# Patient Record
Sex: FEMALE | Race: WHITE | NOT HISPANIC OR LATINO | Employment: UNEMPLOYED | URBAN - NONMETROPOLITAN AREA
[De-identification: names, ages, dates, MRNs, and addresses within clinical notes are randomized per-mention and may not be internally consistent; named-entity substitution may affect disease eponyms.]

---

## 2020-01-30 ENCOUNTER — OFFICE VISIT (OUTPATIENT)
Dept: CARDIOLOGY | Facility: CLINIC | Age: 45
End: 2020-01-30

## 2020-01-30 VITALS
HEIGHT: 61 IN | HEART RATE: 78 BPM | DIASTOLIC BLOOD PRESSURE: 112 MMHG | SYSTOLIC BLOOD PRESSURE: 180 MMHG | WEIGHT: 293 LBS | BODY MASS INDEX: 55.32 KG/M2

## 2020-01-30 DIAGNOSIS — E03.9 HYPOTHYROIDISM, UNSPECIFIED TYPE: ICD-10-CM

## 2020-01-30 DIAGNOSIS — G47.30 SLEEP APNEA IN ADULT: ICD-10-CM

## 2020-01-30 DIAGNOSIS — E66.01 MORBIDLY OBESE (HCC): ICD-10-CM

## 2020-01-30 DIAGNOSIS — E78.00 HYPERCHOLESTEREMIA: ICD-10-CM

## 2020-01-30 DIAGNOSIS — R06.02 SHORTNESS OF BREATH: ICD-10-CM

## 2020-01-30 DIAGNOSIS — I10 ESSENTIAL HYPERTENSION: Primary | ICD-10-CM

## 2020-01-30 DIAGNOSIS — E55.9 VITAMIN D DEFICIENCY: ICD-10-CM

## 2020-01-30 DIAGNOSIS — R01.1 MURMUR, CARDIAC: ICD-10-CM

## 2020-01-30 DIAGNOSIS — R53.82 CHRONIC FATIGUE: ICD-10-CM

## 2020-01-30 PROCEDURE — 93000 ELECTROCARDIOGRAM COMPLETE: CPT | Performed by: INTERNAL MEDICINE

## 2020-01-30 PROCEDURE — 99204 OFFICE O/P NEW MOD 45 MIN: CPT | Performed by: INTERNAL MEDICINE

## 2020-01-30 RX ORDER — NIFEDIPINE 60 MG/1
60 TABLET, EXTENDED RELEASE ORAL DAILY
Qty: 30 TABLET | Refills: 6 | Status: SHIPPED | OUTPATIENT
Start: 2020-01-30 | End: 2020-08-14 | Stop reason: SDUPTHER

## 2020-01-30 RX ORDER — LISINOPRIL AND HYDROCHLOROTHIAZIDE 25; 20 MG/1; MG/1
1 TABLET ORAL DAILY
COMMUNITY
End: 2021-04-01 | Stop reason: SINTOL

## 2020-01-30 RX ORDER — ERGOCALCIFEROL 1.25 MG/1
50000 CAPSULE ORAL WEEKLY
Qty: 5 CAPSULE | Refills: 4 | Status: SHIPPED | OUTPATIENT
Start: 2020-01-30 | End: 2020-04-25 | Stop reason: SDUPTHER

## 2020-01-30 RX ORDER — CYCLOBENZAPRINE HCL 10 MG
10 TABLET ORAL NIGHTLY
COMMUNITY

## 2020-01-30 RX ORDER — LEVOTHYROXINE SODIUM 0.05 MG/1
75 TABLET ORAL DAILY
COMMUNITY

## 2020-01-30 RX ORDER — BUPROPION HYDROCHLORIDE 150 MG/1
150 TABLET, EXTENDED RELEASE ORAL DAILY
COMMUNITY
End: 2021-04-01 | Stop reason: ALTCHOICE

## 2020-01-30 RX ORDER — IBUPROFEN 800 MG/1
800 TABLET ORAL EVERY 8 HOURS PRN
COMMUNITY
End: 2021-04-01 | Stop reason: ALTCHOICE

## 2020-01-30 NOTE — PATIENT INSTRUCTIONS
Mediterranean Diet  A Mediterranean diet refers to food and lifestyle choices that are based on the traditions of countries located on the [place]. This way of eating has been shown to help prevent certain conditions and improve outcomes for people who have chronic diseases, like kidney disease and heart disease.  What are tips for following this plan?  Lifestyle  · Cook and eat meals together with your family, when possible.  · Drink enough fluid to keep your urine clear or pale yellow.  · Be physically active every day. This includes:  ? Aerobic exercise like running or swimming.  ? Leisure activities like gardening, walking, or housework.  · Get 7-8 hours of sleep each night.  · If recommended by your health care provider, drink red wine in moderation. This means 1 glass a day for nonpregnant women and 2 glasses a day for men. A glass of wine equals 5 oz (150 mL).  Reading food labels    · Check the serving size of packaged foods. For foods such as rice and pasta, the serving size refers to the amount of cooked product, not dry.  · Check the total fat in packaged foods. Avoid foods that have saturated fat or trans fats.  · Check the ingredients list for added sugars, such as corn syrup.  Shopping  · At the grocery store, buy most of your food from the areas near the walls of the store. This includes:  ? Fresh fruits and vegetables (produce).  ? Grains, beans, nuts, and seeds. Some of these may be available in unpackaged forms or large amounts (in bulk).  ? Fresh seafood.  ? Poultry and eggs.  ? Low-fat dairy products.  · Buy whole ingredients instead of prepackaged foods.  · Buy fresh fruits and vegetables in-season from local farmers markets.  · Buy frozen fruits and vegetables in resealable bags.  · If you do not have access to quality fresh seafood, buy precooked frozen shrimp or canned fish, such as tuna, salmon, or sardines.  · Buy small amounts of raw or cooked vegetables, salads, or olives from  the deli or salad bar at your store.  · Stock your pantry so you always have certain foods on hand, such as olive oil, canned tuna, canned tomatoes, rice, pasta, and beans.  Cooking  · Cook foods with extra-virgin olive oil instead of using butter or other vegetable oils.  · Have meat as a side dish, and have vegetables or grains as your main dish. This means having meat in small portions or adding small amounts of meat to foods like pasta or stew.  · Use beans or vegetables instead of meat in common dishes like chili or lasagna.  · [place] with different cooking methods. Try roasting or broiling vegetables instead of steaming or sautéeing them.  · Add frozen vegetables to soups, stews, pasta, or rice.  · Add nuts or seeds for added healthy fat at each meal. You can add these to yogurt, salads, or vegetable dishes.  · Marinate fish or vegetables using olive oil, lemon juice, garlic, and fresh herbs.  Meal planning    · Plan to eat 1 vegetarian meal one day each week. Try to work up to 2 vegetarian meals, if possible.  · Eat seafood 2 or more times a week.  · Have healthy snacks readily available, such as:  ? Vegetable sticks with hummus.  ? Greek yogurt.  ? Fruit and nut trail mix.  · Eat balanced meals throughout the week. This includes:  ? Fruit: 2-3 servings a day  ? Vegetables: 4-5 servings a day  ? Low-fat dairy: 2 servings a day  ? Fish, poultry, or lean meat: 1 serving a day  ? Beans and legumes: 2 or more servings a week  ? Nuts and seeds: 1-2 servings a day  ? Whole grains: 6-8 servings a day  ? Extra-virgin olive oil: 3-4 servings a day  · Limit red meat and sweets to only a few servings a month  What are my food choices?  · Mediterranean diet  ? Recommended  ? Grains: Whole-grain pasta. Brown rice. Bulgar wheat. Polenta. Couscous. Whole-wheat bread. Oatmeal. Quinoa.  ? Vegetables: Artichokes. Beets. Broccoli. Cabbage. Carrots. Eggplant. Green beans. Chard. Kale. Spinach. Onions. Leeks. Peas. Squash.  Tomatoes. Peppers. Radishes.  ? Fruits: Apples. Apricots. Avocado. Berries. Bananas. Cherries. Dates. Figs. Grapes. [place]. Melon. Oranges. Peaches. Plums. Pomegranate.  ? Meats and other protein foods: Beans. Almonds. Sunflower seeds. Pine nuts. Peanuts. Cod. [place]. Scallops. Shrimp. Tuna. Tilapia. Clams. Oysters. Eggs.  ? Dairy: Low-fat milk. Cheese. Greek yogurt.  ? Beverages: Water. Red wine. Herbal tea.  ? Fats and oils: Extra virgin olive oil. Avocado oil. Grape seed oil.  ? Sweets and desserts: Greek yogurt with honey. Baked apples. Poached pears. Trail mix.  ? Seasoning and other foods: Basil. Cilantro. Coriander. Cumin. Mint. Parsley. Luis. Rosemary. Tarragon. Garlic. Oregano. Thyme. Pepper. Balsalmic vinegar. Tahini. Hummus. Tomato sauce. Olives. Mushrooms.  ? Limit these  ? Grains: Prepackaged pasta or rice dishes. Prepackaged cereal with added sugar.  ? Vegetables: Deep fried potatoes (french fries).  ? Fruits: Fruit canned in syrup.  ? Meats and other protein foods: Beef. Pork. Lamb. Poultry with skin. Hot dogs. Mike.  ? Dairy: Ice cream. Sour cream. Whole milk.  ? Beverages: Juice. Sugar-sweetened soft drinks. Beer. Liquor and spirits.  ? Fats and oils: Butter. Canola oil. Vegetable oil. Beef fat (tallow). Lard.  ? Sweets and desserts: Cookies. Cakes. Pies. Candy.  ? Seasoning and other foods: Mayonnaise. Premade sauces and marinades.  ? The items listed may not be a complete list. Talk with your dietitian about what dietary choices are right for you.  Summary  · The Mediterranean diet includes both food and lifestyle choices.  · Eat a variety of fresh fruits and vegetables, beans, nuts, seeds, and whole grains.  · Limit the amount of red meat and sweets that you eat.  · Talk with your health care provider about whether it is safe for you to drink red wine in moderation. This means 1 glass a day for nonpregnant women and 2 glasses a day for men. A glass of wine equals 5 oz (150 mL).  This information  is not intended to replace advice given to you by your health care provider. Make sure you discuss any questions you have with your health care provider.  Document Released: 08/10/2017 Document Revised: 09/12/2017 Document Reviewed: 08/10/2017  [employer] Interactive Patient Education © 2019 [employer]

## 2020-01-30 NOTE — PROGRESS NOTES
Chief Complaint   Patient presents with   • Establish Care     HTN, strong family history of CAD   • Hypertension     recently diagnosed, PCP started metoprolol and Lisinopril 2 days ago. Did not take BP meds this morning   • Cardiac history     none   • Aspirin     does not take a daily aspirin   • Labs     most recent labs in chart, lipids elevated, PCP suggested diet and getting thyroid within normal range   • Shortness of Breath     with walking up an incline or stairs, resolves after resting.    • Knee Pain     having a lot of knee pain after a fall, not sure if she could walk on the treadmill.  To see ortho next week.         CARDIAC COMPLAINTS  dyspnea and fatigue      Subjective   Rola Hogan is a 44 y.o. female came in today for her initial cardiac evaluation.  She has no previously diagnosed cardiac problem.  She was seen at your office recently for routine checkup.  She was noted to have a heart rate of 96 with elevated blood pressure of more than 180.  She was put on an ACE inhibitors and beta-blockers.  She has been having increasing shortness of breath for the last few months and apparently the symptoms got worse recently.  It gets worse on incline or walking 1 flight of steps and only resting helps her resolve the dyspnea.  She also has been having a lot of problem with her knee and she is due to see the orthopedic surgeon.  She did undergo some lab work at your office found to have a normal blood count.  Her blood sugar was elevated at 125.  Total cholesterol was 213 with a triglyceride of 161 LDL of 122.  Her A1c was 122 and vitamin D was 11.6.  She also noted to have a normal TSH but low T3 uptake.  She apparently was put on thyroid supplements which she has not started yet.  She also complained of feeling more fatigue and tired in the last 6 months.  She is not able to sleep properly and she wakes up multiple times.  She denies having any chest pain or palpitation.  She does have some  occasional dizziness.  She denies having any nausea or vomiting.  She has no history of smoking.  She does have a family history of premature coronary artery disease.  It runs mostly in the father side of the family.    History reviewed. No pertinent surgical history.    Current Outpatient Medications   Medication Sig Dispense Refill   • buPROPion SR (WELLBUTRIN SR) 150 MG 12 hr tablet Take 150 mg by mouth Daily.     • cyclobenzaprine (FLEXERIL) 10 MG tablet Take 10 mg by mouth Every Night.     • ibuprofen (ADVIL,MOTRIN) 800 MG tablet Take 800 mg by mouth Every 8 (Eight) Hours As Needed for Mild Pain .     • levothyroxine (SYNTHROID, LEVOTHROID) 50 MCG tablet Take 50 mcg by mouth Daily.     • lisinopril-hydrochlorothiazide (PRINZIDE,ZESTORETIC) 20-25 MG per tablet Take 1 tablet by mouth Daily.     • metoprolol tartrate (LOPRESSOR) 25 MG tablet Take 25 mg by mouth Daily.     • NIFEdipine XL (PROCARDIA XL) 60 MG 24 hr tablet Take 1 tablet by mouth Daily. 30 tablet 6     No current facility-administered medications for this visit.            ALLERGIES:  Clove oil; Codeine; Penicillins; and Sulfa antibiotics    Past Medical History:   Diagnosis Date   • Anxiety    • Depression    • History of cholecystectomy    • History of tonsillectomy    • History of tubal ligation    • Hyperlipidemia    • Hypertension    • Hypothyroidism    • Knee pain    • PTSD (post-traumatic stress disorder)        Social History     Tobacco Use   Smoking Status Never Smoker   Smokeless Tobacco Never Used          Family History   Problem Relation Age of Onset   • Cancer Mother    • Heart attack Father         MI in his 60's   • Heart disease Father         s/p multiple stents   • Hyperlipidemia Father    • Hypertension Father    • No Known Problems Sister    • Heart attack Paternal Aunt    • Heart attack Paternal Uncle    • Cancer Maternal Grandmother    • Other Maternal Grandfather         history unknown   • Other Paternal Grandmother          " from complications of gallbladder problems   • Heart attack Paternal Grandfather         MI in his 40's   • Heart disease Paternal Grandfather         CABG   • No Known Problems Sister    • Heart attack Paternal Uncle    • Bipolar disorder Daughter    • Fibromyalgia Daughter        Review of Systems   Constitution: Positive for malaise/fatigue and weight gain. Negative for decreased appetite.   HENT: Negative for congestion and sore throat.    Eyes: Negative for blurred vision.   Cardiovascular: Positive for dyspnea on exertion. Negative for chest pain and palpitations.   Respiratory: Positive for shortness of breath and snoring.    Endocrine: Negative for cold intolerance and heat intolerance.   Hematologic/Lymphatic: Negative for adenopathy. Does not bruise/bleed easily.   Skin: Negative for itching, nail changes and skin cancer.   Musculoskeletal: Positive for arthritis and joint pain. Negative for myalgias.   Gastrointestinal: Negative for abdominal pain, dysphagia and heartburn.   Genitourinary: Negative for bladder incontinence and frequency.   Neurological: Positive for excessive daytime sleepiness. Negative for dizziness, light-headedness, seizures and vertigo.   Psychiatric/Behavioral: Negative for altered mental status.   Allergic/Immunologic: Negative for environmental allergies and hives.       Diabetes- No  Thyroid- abnormal    Objective     BP (!) 180/112 (BP Location: Left arm)   Pulse 78   Ht 154.9 cm (61\")   Wt (!) 151 kg (333 lb)   BMI 62.92 kg/m²     Physical Exam   Constitutional: She is oriented to person, place, and time. She appears well-developed and well-nourished.   HENT:   Head: Normocephalic.   Nose: Nose normal.   Eyes: Pupils are equal, round, and reactive to light. EOM are normal.   Neck: Normal range of motion. Neck supple.   Cardiovascular: Normal rate, regular rhythm, S1 normal and S2 normal.   Murmur heard.  Pulmonary/Chest: Effort normal and breath sounds normal.   "   Abdominal: Soft. Bowel sounds are normal.   Musculoskeletal: Normal range of motion. She exhibits edema.   Neurological: She is alert and oriented to person, place, and time.   Skin: Skin is warm and dry.   Psychiatric: She has a normal mood and affect.         ECG 12 Lead  Date/Time: 1/30/2020 12:47 PM  Performed by: Bony Galvan MD  Authorized by: Bony Galvan MD   Previous ECG: no previous ECG available  Rhythm: sinus rhythm  Rate: normal  QRS axis: normal  Other findings: T wave abnormality    Clinical impression: abnormal EKG              Assessment/Plan   Patient's Body mass index is 62.92 kg/m². BMI is above normal parameters. Recommendations include: educational material, exercise counseling, nutrition counseling and referral to a weight management program.     Rola was seen today for establish care, hypertension, cardiac history, aspirin, labs, shortness of breath and knee pain.    Diagnoses and all orders for this visit:    Essential hypertension  -     NIFEdipine XL (PROCARDIA XL) 60 MG 24 hr tablet; Take 1 tablet by mouth Daily.    Hypercholesteremia    Hypothyroidism, unspecified type    Morbidly obese (CMS/HCC)    Shortness of breath  -     Adult Transthoracic Echo Complete W/ Cont if Necessary Per Protocol; Future    Murmur, cardiac  -     Adult Transthoracic Echo Complete W/ Cont if Necessary Per Protocol; Future    Chronic fatigue  -     Overnight Sleep Oximetry Study; Future    Sleep apnea in adult  -     Overnight Sleep Oximetry Study; Future       At baseline, her heart rate is stable.  Her blood pressure is significantly elevated.  Her EKG shows sinus rhythm, nonspecific T wave changes.  Her clinical examination reveals a BMI of close to 63.  She does have normal heart sounds and soft systolic murmur at the mitral area.  She has trace pedal edema and normal peripheral pulse.    Regarding her hypertension, it is still significantly elevated.  I started her on Procardia XL 60  mg once a day.  She is advised to recheck her blood pressure in 1 week.  If it is getting much better than we can try to reduce the dose of the ACE inhibitor.    Regarding her mild elevation of cholesterol, at this time we will put her on a strict diet and recheck it in 6 months.  If it is still elevated then she might need to be on a statin.    Regarding the hypothyroidism, she has a low T3 uptake and Synthroid has been started.  Needs to be monitored closely.    She does have significant obesity.  I had a long talk with her about diet exercise and weight reduction.  I gave her papers on Mediterranean diet.  I also made an appointment for her to see the bariatric surgeon.    Regarding the chronic fatigue, I scheduled her to undergo nocturnal pulse PO2 measurement.  If it is significantly abnormal then she may need to undergo sleep study.    Regarding her shortness of breath as well as with the mild heart murmur, I scheduled her to undergo an echocardiogram to evaluate LV function, valvular structures, PA pressure.    Based on the results, further recommendations will be made.             Electronically signed by Bony Galvan MD January 30, 2020 12:36 PM

## 2020-02-12 ENCOUNTER — HOSPITAL ENCOUNTER (OUTPATIENT)
Dept: CARDIOLOGY | Facility: HOSPITAL | Age: 45
Discharge: HOME | End: 2020-02-12
Admitting: INTERNAL MEDICINE

## 2020-02-12 VITALS — BODY MASS INDEX: 55.32 KG/M2 | HEIGHT: 61 IN | WEIGHT: 293 LBS

## 2020-02-12 DIAGNOSIS — R01.1 MURMUR, CARDIAC: ICD-10-CM

## 2020-02-12 DIAGNOSIS — R06.02 SHORTNESS OF BREATH: ICD-10-CM

## 2020-02-12 LAB
AORTIC DIMENSIONLESS INDEX: 0.7 (DI)
BH CV ECHO MEAS - AO MAX PG (FULL): 4.9 MMHG
BH CV ECHO MEAS - AO MAX PG: 9.6 MMHG
BH CV ECHO MEAS - AO MEAN PG (FULL): 3 MMHG
BH CV ECHO MEAS - AO MEAN PG: 5 MMHG
BH CV ECHO MEAS - AO ROOT AREA (BSA CORRECTED): 1.3
BH CV ECHO MEAS - AO ROOT AREA: 7.5 CM^2
BH CV ECHO MEAS - AO ROOT DIAM: 3.1 CM
BH CV ECHO MEAS - AO V2 MAX: 155 CM/SEC
BH CV ECHO MEAS - AO V2 MEAN: 101 CM/SEC
BH CV ECHO MEAS - AO V2 VTI: 34.4 CM
BH CV ECHO MEAS - BSA(HAYCOCK): 2.7 M^2
BH CV ECHO MEAS - BSA: 2.3 M^2
BH CV ECHO MEAS - BZI_BMI: 62.9 KILOGRAMS/M^2
BH CV ECHO MEAS - BZI_METRIC_HEIGHT: 154.9 CM
BH CV ECHO MEAS - BZI_METRIC_WEIGHT: 151 KG
BH CV ECHO MEAS - EDV(CUBED): 106.5 ML
BH CV ECHO MEAS - EDV(TEICH): 104.4 ML
BH CV ECHO MEAS - EF(CUBED): 77.1 %
BH CV ECHO MEAS - EF(TEICH): 69.1 %
BH CV ECHO MEAS - ESV(CUBED): 24.4 ML
BH CV ECHO MEAS - ESV(TEICH): 32.2 ML
BH CV ECHO MEAS - FS: 38.8 %
BH CV ECHO MEAS - IVS/LVPW: 0.92
BH CV ECHO MEAS - IVSD: 1.1 CM
BH CV ECHO MEAS - LA DIMENSION: 3.4 CM
BH CV ECHO MEAS - LA/AO: 1.1
BH CV ECHO MEAS - LAT PEAK E' VEL: 10.8 CM/SEC
BH CV ECHO MEAS - LV IVRT: 0.1 SEC
BH CV ECHO MEAS - LV MASS(C)D: 191.3 GRAMS
BH CV ECHO MEAS - LV MASS(C)DI: 81.5 GRAMS/M^2
BH CV ECHO MEAS - LV MAX PG: 4.8 MMHG
BH CV ECHO MEAS - LV MEAN PG: 2 MMHG
BH CV ECHO MEAS - LV V1 MAX: 109 CM/SEC
BH CV ECHO MEAS - LV V1 MEAN: 69.5 CM/SEC
BH CV ECHO MEAS - LV V1 VTI: 26.9 CM
BH CV ECHO MEAS - LVIDD: 4.7 CM
BH CV ECHO MEAS - LVIDS: 2.9 CM
BH CV ECHO MEAS - LVPWD: 1.2 CM
BH CV ECHO MEAS - MED PEAK E' VEL: 10.6 CM/SEC
BH CV ECHO MEAS - MV A MAX VEL: 87.8 CM/SEC
BH CV ECHO MEAS - MV DEC SLOPE: 691 CM/SEC^2
BH CV ECHO MEAS - MV DEC TIME: 0.19 SEC
BH CV ECHO MEAS - MV E MAX VEL: 110 CM/SEC
BH CV ECHO MEAS - MV E/A: 1.3
BH CV ECHO MEAS - MV MAX PG: 5.6 MMHG
BH CV ECHO MEAS - MV MEAN PG: 3 MMHG
BH CV ECHO MEAS - MV P1/2T MAX VEL: 131 CM/SEC
BH CV ECHO MEAS - MV P1/2T: 55.5 MSEC
BH CV ECHO MEAS - MV V2 MAX: 118 CM/SEC
BH CV ECHO MEAS - MV V2 MEAN: 73.7 CM/SEC
BH CV ECHO MEAS - MV V2 VTI: 33.7 CM
BH CV ECHO MEAS - MVA P1/2T LCG: 1.7 CM^2
BH CV ECHO MEAS - MVA(P1/2T): 4 CM^2
BH CV ECHO MEAS - PA MAX PG (FULL): 1.4 MMHG
BH CV ECHO MEAS - PA MAX PG: 3.8 MMHG
BH CV ECHO MEAS - PA MEAN PG (FULL): 1 MMHG
BH CV ECHO MEAS - PA MEAN PG: 2 MMHG
BH CV ECHO MEAS - PA V2 MAX: 97.6 CM/SEC
BH CV ECHO MEAS - PA V2 MEAN: 58.2 CM/SEC
BH CV ECHO MEAS - PA V2 VTI: 22 CM
BH CV ECHO MEAS - RAP SYSTOLE: 10 MMHG
BH CV ECHO MEAS - RV MAX PG: 2.4 MMHG
BH CV ECHO MEAS - RV MEAN PG: 1 MMHG
BH CV ECHO MEAS - RV V1 MAX: 77.7 CM/SEC
BH CV ECHO MEAS - RV V1 MEAN: 48.8 CM/SEC
BH CV ECHO MEAS - RV V1 VTI: 17.8 CM
BH CV ECHO MEAS - RVDD: 2.6 CM
BH CV ECHO MEAS - RVSP: 22 MMHG
BH CV ECHO MEAS - SI(AO): 110.7 ML/M^2
BH CV ECHO MEAS - SI(CUBED): 35 ML/M^2
BH CV ECHO MEAS - SI(TEICH): 30.8 ML/M^2
BH CV ECHO MEAS - SV(AO): 259.6 ML
BH CV ECHO MEAS - SV(CUBED): 82.1 ML
BH CV ECHO MEAS - SV(TEICH): 72.2 ML
BH CV ECHO MEAS - TR MAX VEL: 173 CM/SEC
BH CV ECHO MEASUREMENTS AVERAGE E/E' RATIO: 10.28
MAXIMAL PREDICTED HEART RATE: 177 BPM
STRESS TARGET HR: 150 BPM

## 2020-02-12 PROCEDURE — 93306 TTE W/DOPPLER COMPLETE: CPT

## 2020-02-12 PROCEDURE — 93306 TTE W/DOPPLER COMPLETE: CPT | Performed by: INTERNAL MEDICINE

## 2020-04-25 DIAGNOSIS — E55.9 VITAMIN D DEFICIENCY: ICD-10-CM

## 2020-04-27 RX ORDER — ERGOCALCIFEROL 1.25 MG/1
50000 CAPSULE ORAL WEEKLY
Qty: 5 CAPSULE | Refills: 4 | Status: SHIPPED | OUTPATIENT
Start: 2020-04-27 | End: 2020-08-14 | Stop reason: SDUPTHER

## 2020-04-27 RX ORDER — ERGOCALCIFEROL 1.25 MG/1
50000 CAPSULE ORAL WEEKLY
Qty: 5 CAPSULE | Refills: 4 | Status: SHIPPED | OUTPATIENT
Start: 2020-04-27 | End: 2020-04-27 | Stop reason: SDUPTHER

## 2020-08-14 ENCOUNTER — OFFICE VISIT (OUTPATIENT)
Dept: CARDIOLOGY | Facility: CLINIC | Age: 45
End: 2020-08-14

## 2020-08-14 VITALS
HEART RATE: 72 BPM | DIASTOLIC BLOOD PRESSURE: 68 MMHG | WEIGHT: 293 LBS | TEMPERATURE: 97.1 F | SYSTOLIC BLOOD PRESSURE: 118 MMHG | BODY MASS INDEX: 55.32 KG/M2 | HEIGHT: 61 IN

## 2020-08-14 DIAGNOSIS — E55.9 VITAMIN D DEFICIENCY: ICD-10-CM

## 2020-08-14 DIAGNOSIS — I10 ESSENTIAL HYPERTENSION: ICD-10-CM

## 2020-08-14 DIAGNOSIS — E66.01 CLASS 3 SEVERE OBESITY DUE TO EXCESS CALORIES WITH SERIOUS COMORBIDITY AND BODY MASS INDEX (BMI) OF 60.0 TO 69.9 IN ADULT (HCC): ICD-10-CM

## 2020-08-14 DIAGNOSIS — G47.34 NOCTURNAL OXYGEN DESATURATION: ICD-10-CM

## 2020-08-14 DIAGNOSIS — R06.02 SHORTNESS OF BREATH: ICD-10-CM

## 2020-08-14 PROCEDURE — 99213 OFFICE O/P EST LOW 20 MIN: CPT | Performed by: NURSE PRACTITIONER

## 2020-08-14 RX ORDER — BUSPIRONE HYDROCHLORIDE 10 MG/1
10 TABLET ORAL 2 TIMES DAILY
COMMUNITY

## 2020-08-14 RX ORDER — ERGOCALCIFEROL 1.25 MG/1
50000 CAPSULE ORAL WEEKLY
Qty: 5 CAPSULE | Refills: 6 | Status: SHIPPED | OUTPATIENT
Start: 2020-08-14

## 2020-08-14 RX ORDER — NIFEDIPINE 60 MG/1
60 TABLET, EXTENDED RELEASE ORAL DAILY
Qty: 30 TABLET | Refills: 6 | Status: SHIPPED | OUTPATIENT
Start: 2020-08-14 | End: 2021-04-01 | Stop reason: SDUPTHER

## 2020-08-14 NOTE — PROGRESS NOTES
Chief Complaint   Patient presents with   • Follow-up     Cardiac management . Had Overnight Oximetry and  is now using Oxygen 2L/NC . Has not been able to have weight loss surgery   • Hypertension     Reports HTN has improved , last reading at PCP  was 106/71   • Med Refill     Needs refills on Nifidipine and Vitamin D 30 day supply to [facility] pharmacy       Gagandeep Hogan is a 45 y.o. female seen in January 2020 for initial cardiac evaluation of increased heart rate and blood pressure.  ACE inhibitor and beta-blocker were started.  No previous cardiac problems noted.  Her symptoms included shortness of breath and fatigue.  She admitted to sleep issues in form of waking up multiple times.  Labs had shown increased blood sugar being 125.  Total cholesterol 213, triglycerides 161, and , A1c was increased and vitamin D was low being 11.6.  TSH was normal but T3 uptake low.  At consultation Procardia XL was prescribed.  Echo and overnight oxygen study ordered.  LVEF normal and mild MR noted.  Overnight study showed significant nocturnal desaturation.    Today she comes to the office for a follow up visit. She is now using oxygen at night with benefit but watch monitor shows she still has episodes of desaturation. No cardiac symptoms reported. She does have shortness of breath with exertion but no worse than before. Blood pressure is well controlled with current medications. Despite diet effort she has not been able to lose weight and plans to have weight loss surgery once scheduling elective surgery post covid restrictions.        Cardiac History:    Past Surgical History:   Procedure Laterality Date   • ECHO - CONVERTED  02/12/2020    TLS. EF 65%. Mild MR. RVSP- 22 mmHg.       Current Outpatient Medications   Medication Sig Dispense Refill   • buPROPion SR (WELLBUTRIN SR) 150 MG 12 hr tablet Take 150 mg by mouth Daily.     • busPIRone (BUSPAR) 10 MG tablet Take 10 mg by mouth 2 (two) times a day.      • cyclobenzaprine (FLEXERIL) 10 MG tablet Take 10 mg by mouth Every Night.     • ibuprofen (ADVIL,MOTRIN) 800 MG tablet Take 800 mg by mouth Every 8 (Eight) Hours As Needed for Mild Pain .     • levothyroxine (SYNTHROID, LEVOTHROID) 50 MCG tablet Take 75 mcg by mouth Daily.     • lisinopril-hydrochlorothiazide (PRINZIDE,ZESTORETIC) 20-25 MG per tablet Take 1 tablet by mouth Daily.     • metoprolol tartrate (LOPRESSOR) 25 MG tablet Take 25 mg by mouth Daily.     • NIFEdipine XL (PROCARDIA XL) 60 MG 24 hr tablet Take 1 tablet by mouth Daily. 30 tablet 6   • vitamin D (ERGOCALCIFEROL) 1.25 MG (80115 UT) capsule capsule Take 1 capsule by mouth 1 (One) Time Per Week. 5 capsule 6     No current facility-administered medications for this visit.        Clove oil; Codeine; Penicillins; and Sulfa antibiotics    Past Medical History:   Diagnosis Date   • Anxiety    • Depression    • History of cholecystectomy    • History of tonsillectomy    • History of tubal ligation    • Hyperlipidemia    • Hypertension    • Hypothyroidism    • Knee pain    • PTSD (post-traumatic stress disorder)        Social History     Socioeconomic History   • Marital status:      Spouse name: Not on file   • Number of children: Not on file   • Years of education: Not on file   • Highest education level: Not on file   Tobacco Use   • Smoking status: Never Smoker   • Smokeless tobacco: Never Used   Substance and Sexual Activity   • Alcohol use: Never     Frequency: Never   • Drug use: Never       Family History   Problem Relation Age of Onset   • Cancer Mother    • Heart attack Father         MI in his 60's   • Heart disease Father         s/p multiple stents   • Hyperlipidemia Father    • Hypertension Father    • No Known Problems Sister    • Heart attack Paternal Aunt    • Heart attack Paternal Uncle    • Cancer Maternal Grandmother    • Other Maternal Grandfather         history unknown   • Other Paternal Grandmother          from  "complications of gallbladder problems   • Heart attack Paternal Grandfather         MI in his 40's   • Heart disease Paternal Grandfather         CABG   • No Known Problems Sister    • Heart attack Paternal Uncle    • Bipolar disorder Daughter    • Fibromyalgia Daughter        Review of Systems   Constitution: Negative for decreased appetite, diaphoresis and malaise/fatigue.   HENT: Negative for nosebleeds.    Eyes: Negative for blurred vision.   Cardiovascular: Negative for chest pain, claudication, cyanosis, dyspnea on exertion, irregular heartbeat, leg swelling, near-syncope, orthopnea, palpitations, paroxysmal nocturnal dyspnea and syncope.   Respiratory: Positive for shortness of breath and sleep disturbances due to breathing.    Endocrine: Negative for cold intolerance and heat intolerance.   Hematologic/Lymphatic: Does not bruise/bleed easily.   Skin: Negative for rash.   Musculoskeletal: Negative for myalgias.   Gastrointestinal: Negative for heartburn, melena and nausea.   Genitourinary: Negative for dysuria and hematuria.   Neurological: Negative for dizziness and light-headedness.   Psychiatric/Behavioral: The patient does not have insomnia and is not nervous/anxious.         Objective     /68 (BP Location: Right arm)   Pulse 72   Temp 97.1 °F (36.2 °C)   Ht 154 cm (60.63\")   Wt (!) 153 kg (338 lb)   BMI 64.65 kg/m²     Physical Exam   Constitutional: She is oriented to person, place, and time. She appears well-nourished.   HENT:   Head: Normocephalic.   Eyes: Pupils are equal, round, and reactive to light. Conjunctivae are normal.   Neck: Normal range of motion. Neck supple. Carotid bruit is not present.   Cardiovascular: Normal rate, regular rhythm, S1 normal and S2 normal.   No murmur heard.  Pulmonary/Chest: Breath sounds normal. She has no wheezes. She has no rales.   Abdominal: Soft. Bowel sounds are normal.   Musculoskeletal: Normal range of motion. She exhibits no edema.   Neurological: " She is alert and oriented to person, place, and time.   Skin: Skin is warm.   Psychiatric: She has a normal mood and affect. Her behavior is normal.        Procedures: none today       Problem List Items Addressed This Visit        Cardiovascular and Mediastinum    Essential hypertension    Relevant Medications    NIFEdipine XL (PROCARDIA XL) 60 MG 24 hr tablet       Respiratory    Shortness of breath    Relevant Orders    Ambulatory Referral to Pulmonology (Completed)      Other Visit Diagnoses     Nocturnal oxygen desaturation        Relevant Orders    Ambulatory Referral to Pulmonology (Completed)    Class 3 severe obesity due to excess calories with serious comorbidity and body mass index (BMI) of 60.0 to 69.9 in adult (CMS/[facility])        Vitamin D deficiency        Relevant Medications    vitamin D (ERGOCALCIFEROL) 1.25 MG (21612 UT) capsule capsule         Her blood pressure is well controlled. Heart rate and rhythm normal. Continue same cardiac medications and refills faxed to her pharmacy.     She has completed trial of weight loss management by diet which was unsuccessful. She plans to proceed with weight loss surgery. I advised her to contact the office if cardiac clearance needed.     Patient's Body mass index is 64.65 kg/m². BMI is above normal parameters. Recommendations include: nutrition counseling. Continue diet efforts. Exercise is limited due to history of knee problems and weight.     She will see you for lab orders/management. Please forward copy of next lab results. Refills given for Vit D. Please recheck level next lab order.     From a cardiac standpoint patient appears stable. No further cardiac workup advised at this time. A 6 month follow up visit scheduled. Please call sooner for cardiac concerns.            Electronically signed by NOMAN Reyez,  August 14, 2020 11:23

## 2020-08-14 NOTE — PATIENT INSTRUCTIONS
Metabolic Syndrome  Metabolic syndrome occurs when you have a combination of three or more factors that increase your chances of developing cardiovascular disease and diabetes. These factors include:  · High fasting blood sugar (glucose).  · High blood triglyceride level.  · High blood pressure.  · Low levels of high-density lipoprotein (HDL) blood cholesterol.  · Having a waist measurement that is:  ? More than 40 inches in men.  ? More than 35 inches in women.  Metabolic syndrome is sometimes called insulin resistance syndrome or syndrome X.  What are the causes?  The exact cause of this condition is not known. It may be related to a combination of the factors that were passed down from your parents (genes) and things that you do, eat, and drink (lifestyle choices).  What increases the risk?  You are more likely to develop this condition if you:  · Eat a diet high in calories and saturated fat.  · Do not exercise regularly.  · Are obese.  · Have a family history of type 2 diabetes mellitus.  · Have insulin resistance.  · Have a history of gestational diabetes during a previous pregnancy.  · Have conditions such as cardiovascular disease, nonalcoholic fatty liver disease, or polycystic ovary syndrome (PCOS).  · Are older. The risk increases with age.  · Use any tobacco products, including cigarettes, chewing tobacco, or e-cigarettes.  What are the signs or symptoms?  Metabolic syndrome has no specific symptoms. Having abnormal blood test results may be the only signs of metabolic syndrome.  How is this diagnosed?  This condition may be diagnosed based on:  · Your blood pressure measurements.  · Your waist measurement.  · Blood tests.  · Your personal and family medical history.  How is this treated?  Treatment may include:  · Lifestyle changes to reduce your risk for heart disease, stroke, and diabetes, such as:  ? Exercise.  ? Weight loss.  ? Eating a healthy diet.  ? Stopping tobacco and nicotine  use.  · Medicines that:  ? Help your body maintain normal blood glucose levels.  ? Lower your blood pressure and your blood triglyceride levels.  Follow these instructions at home:         · Take over-the-counter and prescription medicines only as told by your health care provider.  · Exercise regularly, as told by your health care provider.  · Eat a healthy diet that includes fresh fruits and vegetables, whole grains, lean proteins, and low-fat or nonfat dairy products.  · Maintain a healthy weight. Work with your health care provider to lose weight safely, if needed.  · Do not use any products that contain nicotine or tobacco, such as cigarettes and e-cigarettes. If you need help quitting, ask your health care provider.  · If directed, measure your waist regularly and write down the measurements. To measure your waist:  ? Stand up straight.  ? Breathe out.  ? Wrap a measuring tape around the part of your waist that is just above your hip bones.  ? Read and write down the measurement.  · Keep all follow-up visits as told by your health care provider. This is important.  Contact a health care provider if:  · You feel very tired.  · You are extremely thirsty.  · You urinate a lot more than usual.  · Your waist gets bigger.  · You have headaches that do not go away.  Get help right away if:  · You suddenly develop any of the following:  ? Dizziness.  ? Blurry vision.  ? Trouble speaking.  ? Trouble swallowing.  ? Weakness in an arm or leg.  ? Chest pain.  ? Trouble breathing.  · Your heartbeat feels abnormal.  · You faint.  Summary  · Metabolic syndrome occurs when you have a combination of three or more factors that increase your chances of developing cardiovascular disease and diabetes.  · These factors include a high fasting blood sugar (glucose), high blood triglyceride level, high blood pressure, low levels of high-density lipoprotein (HDL) blood cholesterol, and a waist measurement that is more than 40 inches in  men or more than 35 inches in women.  · Metabolic syndrome has no specific symptoms. Having abnormal blood test results may be the only signs of metabolic syndrome.  · Treatment may include lifestyle changes and medicine to reduce your risk for heart disease, stroke, and diabetes.  This information is not intended to replace advice given to you by your health care provider. Make sure you discuss any questions you have with your health care provider.  Document Released: 03/26/2009 Document Revised: 12/31/2018 Document Reviewed: 12/31/2018  [employer]vier Patient Education © 2020 [employer]  Sleep Apnea  Sleep apnea is a condition in which breathing pauses or becomes shallow during sleep. Episodes of sleep apnea usually last 10 seconds or longer, and they may occur as many as 20 times an hour. Sleep apnea disrupts your sleep and keeps your body from getting the rest that it needs. This condition can increase your risk of certain health problems, including:  · Heart attack.  · Stroke.  · Obesity.  · Diabetes.  · Heart failure.  · Irregular heartbeat.  What are the causes?  There are three kinds of sleep apnea:  · Obstructive sleep apnea. This kind is caused by a blocked or collapsed airway.  · Central sleep apnea. This kind happens when the part of the brain that controls breathing does not send the correct signals to the muscles that control breathing.  · Mixed sleep apnea. This is a combination of obstructive and central sleep apnea.  The most common cause of this condition is a collapsed or blocked airway. An airway can collapse or become blocked if:  · Your throat muscles are abnormally relaxed.  · Your tongue and tonsils are larger than normal.  · You are overweight.  · Your airway is smaller than normal.  What increases the risk?  You are more likely to develop this condition if you:  · Are overweight.  · Smoke.  · Have a smaller than normal airway.  · Are elderly.  · Are male.  · Drink alcohol.  · Take sedatives or  tranquilizers.  · Have a family history of sleep apnea.  What are the signs or symptoms?  Symptoms of this condition include:  · Trouble staying asleep.  · Daytime sleepiness and tiredness.  · Irritability.  · Loud snoring.  · Morning headaches.  · Trouble concentrating.  · Forgetfulness.  · Decreased interest in sex.  · Unexplained sleepiness.  · Mood swings.  · Personality changes.  · Feelings of depression.  · Waking up often during the night to urinate.  · Dry mouth.  · Sore throat.  How is this diagnosed?  This condition may be diagnosed with:  · A medical history.  · A physical exam.  · A series of tests that are done while you are sleeping (sleep study). These tests are usually done in a sleep lab, but they may also be done at home.  How is this treated?  Treatment for this condition aims to restore normal breathing and to ease symptoms during sleep. It may involve managing health issues that can affect breathing, such as high blood pressure or obesity. Treatment may include:  · Sleeping on your side.  · Using a decongestant if you have nasal congestion.  · Avoiding the use of depressants, including alcohol, sedatives, and narcotics.  · Losing weight if you are overweight.  · Making changes to your diet.  · Quitting smoking.  · Using a device to open your airway while you sleep, such as:  ? An oral appliance. This is a custom-made mouthpiece that shifts your lower jaw forward.  ? A continuous positive airway pressure (CPAP) device. This device blows air through a mask when you breathe out (exhale).  ? A nasal expiratory positive airway pressure (EPAP) device. This device has valves that you put into each nostril.  ? A bi-level positive airway pressure (BPAP) device. This device blows air through a mask when you breathe in (inhale) and breathe out (exhale).  · Having surgery if other treatments do not work. During surgery, excess tissue is removed to create a wider airway.  It is important to get treatment for  sleep apnea. Without treatment, this condition can lead to:  · High blood pressure.  · Coronary artery disease.  · In men, an inability to achieve or maintain an erection (impotence).  · Reduced thinking abilities.  Follow these instructions at home:  Lifestyle  · Make any lifestyle changes that your health care provider recommends.  · Eat a healthy, well-balanced diet.  · Take steps to lose weight if you are overweight.  · Avoid using depressants, including alcohol, sedatives, and narcotics.  · Do not use any products that contain nicotine or tobacco, such as cigarettes, e-cigarettes, and chewing tobacco. If you need help quitting, ask your health care provider.  General instructions  · Take over-the-counter and prescription medicines only as told by your health care provider.  · If you were given a device to open your airway while you sleep, use it only as told by your health care provider.  · If you are having surgery, make sure to tell your health care provider you have sleep apnea. You may need to bring your device with you.  · Keep all follow-up visits as told by your health care provider. This is important.  Contact a health care provider if:  · The device that you received to open your airway during sleep is uncomfortable or does not seem to be working.  · Your symptoms do not improve.  · Your symptoms get worse.  Get help right away if:  · You develop:  ? Chest pain.  ? Shortness of breath.  ? Discomfort in your back, arms, or stomach.  · You have:  ? Trouble speaking.  ? Weakness on one side of your body.  ? Drooping in your face.  These symptoms may represent a serious problem that is an emergency. Do not wait to see if the symptoms will go away. Get medical help right away. Call your local emergency services (911 in the [place] Do not drive yourself to the hospital.  Summary  · Sleep apnea is a condition in which breathing pauses or becomes shallow during sleep.  · The most common cause is a collapsed or  blocked airway.  · The goal of treatment is to restore normal breathing and to ease symptoms during sleep.  This information is not intended to replace advice given to you by your health care provider. Make sure you discuss any questions you have with your health care provider.  Document Released: 12/08/2003 Document Revised: 10/04/2019 Document Reviewed: 08/13/2019  [employer] Patient Education © 2020 [employer]  Calorie Counting for Weight Loss  Calories are units of energy. Your body needs a certain amount of calories from food to keep you going throughout the day. When you eat more calories than your body needs, your body stores the extra calories as fat. When you eat fewer calories than your body needs, your body burns fat to get the energy it needs.  Calorie counting means keeping track of how many calories you eat and drink each day. Calorie counting can be helpful if you need to lose weight. If you make sure to eat fewer calories than your body needs, you should lose weight. Ask your health care provider what a healthy weight is for you.  For calorie counting to work, you will need to eat the right number of calories in a day in order to lose a healthy amount of weight per week. A dietitian can help you determine how many calories you need in a day and will give you suggestions on how to reach your calorie goal.  · A healthy amount of weight to lose per week is usually 1-2 lb (0.5-0.9 kg). This usually means that your daily calorie intake should be reduced by 500-750 calories.  · Eating 1,200 - 1,500 calories per day can help most women lose weight.  · Eating 1,500 - 1,800 calories per day can help most men lose weight.  What is my plan?  My goal is to have __________ calories per day.  If I have this many calories per day, I should lose around __________ pounds per week.  What do I need to know about calorie counting?  In order to meet your daily calorie goal, you will need to:  · Find out how many  calories are in each food you would like to eat. Try to do this before you eat.  · Decide how much of the food you plan to eat.  · Write down what you ate and how many calories it had. Doing this is called keeping a food log.  To successfully lose weight, it is important to balance calorie counting with a healthy lifestyle that includes regular activity. Aim for 150 minutes of moderate exercise (such as walking) or 75 minutes of vigorous exercise (such as running) each week.  Where do I find calorie information?    The number of calories in a food can be found on a Nutrition Facts label. If a food does not have a Nutrition Facts label, try to look up the calories online or ask your dietitian for help.  Remember that calories are listed per serving. If you choose to have more than one serving of a food, you will have to multiply the calories per serving by the amount of servings you plan to eat. For example, the label on a package of bread might say that a serving size is 1 slice and that there are 90 calories in a serving. If you eat 1 slice, you will have eaten 90 calories. If you eat 2 slices, you will have eaten 180 calories.  How do I keep a food log?  Immediately after each meal, record the following information in your food log:  · What you ate. Don't forget to include toppings, sauces, and other extras on the food.  · How much you ate. This can be measured in cups, ounces, or number of items.  · How many calories each food and drink had.  · The total number of calories in the meal.  Keep your food log near you, such as in a small notebook in your pocket, or use a mobile sherine or website. Some programs will calculate calories for you and show you how many calories you have left for the day to meet your goal.  What are some calorie counting tips?    · Use your calories on foods and drinks that will fill you up and not leave you hungry:  ? Some examples of foods that fill you up are nuts and nut butters,  "vegetables, lean proteins, and high-fiber foods like whole grains. High-fiber foods are foods with more than 5 g fiber per serving.  ? Drinks such as sodas, specialty coffee drinks, alcohol, and juices have a lot of calories, yet do not fill you up.  · Eat nutritious foods and avoid empty calories. Empty calories are calories you get from foods or beverages that do not have many vitamins or protein, such as candy, sweets, and soda. It is better to have a nutritious high-calorie food (such as an avocado) than a food with few nutrients (such as a bag of chips).  · Know how many calories are in the foods you eat most often. This will help you calculate calorie counts faster.  · Pay attention to calories in drinks. Low-calorie drinks include water and unsweetened drinks.  · Pay attention to nutrition labels for \"low fat\" or \"fat free\" foods. These foods sometimes have the same amount of calories or more calories than the full fat versions. They also often have added sugar, starch, or salt, to make up for flavor that was removed with the fat.  · Find a way of tracking calories that works for you. Get creative. Try different apps or programs if writing down calories does not work for you.  What are some portion control tips?  · Know how many calories are in a serving. This will help you know how many servings of a certain food you can have.  · Use a measuring cup to measure serving sizes. You could also try weighing out portions on a kitchen scale. With time, you will be able to estimate serving sizes for some foods.  · Take some time to put servings of different foods on your favorite plates, bowls, and cups so you know what a serving looks like.  · Try not to eat straight from a bag or box. Doing this can lead to overeating. Put the amount you would like to eat in a cup or on a plate to make sure you are eating the right portion.  · Use smaller plates, glasses, and bowls to prevent overeating.  · Try not to multitask " "(for example, watch TV or use your computer) while eating. If it is time to eat, sit down at a table and enjoy your food. This will help you to know when you are full. It will also help you to be aware of what you are eating and how much you are eating.  What are tips for following this plan?  Reading food labels  · Check the calorie count compared to the serving size. The serving size may be smaller than what you are used to eating.  · Check the source of the calories. Make sure the food you are eating is high in vitamins and protein and low in saturated and trans fats.  Shopping  · Read nutrition labels while you shop. This will help you make healthy decisions before you decide to purchase your food.  · Make a grocery list and stick to it.  Cooking  · Try to cook your favorite foods in a healthier way. For example, try baking instead of frying.  · Use low-fat dairy products.  Meal planning  · Use more fruits and vegetables. Half of your plate should be fruits and vegetables.  · Include lean proteins like poultry and fish.  How do I count calories when eating out?  · Ask for smaller portion sizes.  · Consider sharing an entree and sides instead of getting your own entree.  · If you get your own entree, eat only half. Ask for a box at the beginning of your meal and put the rest of your entree in it so you are not tempted to eat it.  · If calories are listed on the menu, choose the lower calorie options.  · Choose dishes that include vegetables, fruits, whole grains, low-fat dairy products, and lean protein.  · Choose items that are boiled, broiled, grilled, or steamed. Stay away from items that are buttered, battered, fried, or served with cream sauce. Items labeled \"crispy\" are usually fried, unless stated otherwise.  · Choose water, low-fat milk, unsweetened iced tea, or other drinks without added sugar. If you want an alcoholic beverage, choose a lower calorie option such as a glass of wine or light beer.  · Ask " for dressings, sauces, and syrups on the side. These are usually high in calories, so you should limit the amount you eat.  · If you want a salad, choose a garden salad and ask for grilled meats. Avoid extra toppings like bradley, cheese, or fried items. Ask for the dressing on the side, or ask for olive oil and vinegar or lemon to use as dressing.  · Estimate how many servings of a food you are given. For example, a serving of cooked rice is ½ cup or about the size of half a baseball. Knowing serving sizes will help you be aware of how much food you are eating at restaurants. The list below tells you how big or small some common portion sizes are based on everyday objects:  ? 1 oz--4 stacked dice.  ? 3 oz--1 deck of cards.  ? 1 tsp--1 die.  ? 1 Tbsp--½ a ping-pong ball.  ? 2 Tbsp--1 ping-pong ball.  ? ½ cup--½ baseball.  ? 1 cup--1 baseball.  Summary  · Calorie counting means keeping track of how many calories you eat and drink each day. If you eat fewer calories than your body needs, you should lose weight.  · A healthy amount of weight to lose per week is usually 1-2 lb (0.5-0.9 kg). This usually means reducing your daily calorie intake by 500-750 calories.  · The number of calories in a food can be found on a Nutrition Facts label. If a food does not have a Nutrition Facts label, try to look up the calories online or ask your dietitian for help.  · Use your calories on foods and drinks that will fill you up, and not on foods and drinks that will leave you hungry.  · Use smaller plates, glasses, and bowls to prevent overeating.  This information is not intended to replace advice given to you by your health care provider. Make sure you discuss any questions you have with your health care provider.  Document Released: 12/18/2006 Document Revised: 09/06/2019 Document Reviewed: 11/17/2017  Elsevier Patient Education © 2020 [employer]

## 2020-08-26 DIAGNOSIS — I10 ESSENTIAL HYPERTENSION: ICD-10-CM

## 2020-08-26 RX ORDER — NIFEDIPINE 60 MG/1
TABLET, FILM COATED, EXTENDED RELEASE ORAL
Qty: 30 TABLET | Refills: 6 | OUTPATIENT
Start: 2020-08-26

## 2020-10-29 ENCOUNTER — TELEPHONE (OUTPATIENT)
Dept: CARDIOLOGY | Facility: CLINIC | Age: 45
End: 2020-10-29

## 2020-10-29 NOTE — TELEPHONE ENCOUNTER
Received fax from [facility] for cardiac clearance for patient to have weight loss surgery. According to our records, I do not see where patient is on any blood thinners or has had any stenting. Patient had echo on 02/12/2020.      Fax [phone]

## 2021-01-15 ENCOUNTER — TELEPHONE (OUTPATIENT)
Dept: CARDIOLOGY | Facility: CLINIC | Age: 46
End: 2021-01-15

## 2021-01-15 NOTE — TELEPHONE ENCOUNTER
Patient called in to report she had Gastric sleeve placement on Tuesday 1-. Her surgeon has asked if is possible for her to quit taking Prinzide d/t risk for dehydration .  She adds that her BP readings are 137/63, 123/70 and most recent is 108/63 .

## 2021-01-18 NOTE — TELEPHONE ENCOUNTER
I spoke with patient ands she is aware to hold Prinzide , she is aware to call if BP rebounds.  She is reminded of appointment  2-  , has confirmed appointment at this time

## 2021-04-01 ENCOUNTER — OFFICE VISIT (OUTPATIENT)
Dept: CARDIOLOGY | Facility: CLINIC | Age: 46
End: 2021-04-01

## 2021-04-01 VITALS
DIASTOLIC BLOOD PRESSURE: 80 MMHG | HEIGHT: 61 IN | BODY MASS INDEX: 50.67 KG/M2 | SYSTOLIC BLOOD PRESSURE: 130 MMHG | TEMPERATURE: 97.1 F | WEIGHT: 268.4 LBS | HEART RATE: 70 BPM

## 2021-04-01 DIAGNOSIS — E78.00 HYPERCHOLESTEREMIA: ICD-10-CM

## 2021-04-01 DIAGNOSIS — E66.01 MORBIDLY OBESE (HCC): Primary | ICD-10-CM

## 2021-04-01 DIAGNOSIS — R01.1 MURMUR, CARDIAC: ICD-10-CM

## 2021-04-01 DIAGNOSIS — I10 ESSENTIAL HYPERTENSION: ICD-10-CM

## 2021-04-01 DIAGNOSIS — G47.30 SLEEP APNEA IN ADULT: ICD-10-CM

## 2021-04-01 DIAGNOSIS — R06.02 SHORTNESS OF BREATH: ICD-10-CM

## 2021-04-01 PROCEDURE — 99213 OFFICE O/P EST LOW 20 MIN: CPT | Performed by: NURSE PRACTITIONER

## 2021-04-01 RX ORDER — DIPHENOXYLATE HYDROCHLORIDE AND ATROPINE SULFATE 2.5; .025 MG/1; MG/1
1 TABLET ORAL DAILY
COMMUNITY

## 2021-04-01 RX ORDER — FLUOXETINE 10 MG/1
10 CAPSULE ORAL DAILY
COMMUNITY

## 2021-04-01 RX ORDER — FERROUS SULFATE TAB EC 324 MG (65 MG FE EQUIVALENT) 324 (65 FE) MG
324 TABLET DELAYED RESPONSE ORAL
COMMUNITY

## 2021-04-01 NOTE — PROGRESS NOTES
Chief Complaint   Patient presents with   • Follow-up     Cardiac management . Has no cardiac compliants today.   • LABS     Had labs done March 3,2021, she will have PCP fax a copy to us. K+ had been low and PCP started on supplement and completed therapy.   • Med Refill     Needs refills on Nifedipine and Vitamin D 90 day supply to [facility] pharmacy       Subjective       Rola Hogan is a 45 y.o. female seen in January 2020 for initial cardiac evaluation of increased heart rate and blood pressure.  ACE inhibitor and beta-blocker were started.  No previous cardiac problems noted.  Her symptoms included shortness of breath and fatigue.  She admitted to sleep issues in form of waking up multiple times.  Labs had shown increased blood sugar being 125.  Total cholesterol 213, triglycerides 161, and , A1c was increased and vitamin D was low being 11.6.  TSH was normal but T3 uptake low.  At consultation Procardia XL was prescribed.  Echo and overnight oxygen study ordered.  LVEF normal and mild MR noted.  Overnight study showed significant nocturnal desaturation and supplemental oxygen prescribed.     Today she comes to the office for a follow-up visit.  She underwent gastric sleeve surgery in January without issue.  Her weight is down about 70 pounds.  Zestoretic has been discontinued.  She monitors vital signs closely at home.  Resting heart rates usually in the 50s.  Blood pressure is low normal to normal at home.  She continues to exercise daily.  Rarely she is experienced some mild lightheadedness which she feels could have been related to mild dehydration.  No recent palpitations, chest pain, or inappropriate shortness of breath noted.  Fatigue has much improved since weight loss.      Cardiac History:    Past Surgical History:   Procedure Laterality Date   • ECHO - CONVERTED  02/12/2020    TLS. EF 65%. Mild MR. RVSP- 22 mmHg.       Current Outpatient Medications   Medication Sig Dispense Refill   •  busPIRone (BUSPAR) 10 MG tablet Take 10 mg by mouth 2 (two) times a day.     • cyclobenzaprine (FLEXERIL) 10 MG tablet Take 10 mg by mouth Every Night.     • ferrous sulfate 324 (65 Fe) MG tablet delayed-release EC tablet Take 324 mg by mouth Daily With Breakfast.     • FLUoxetine (PROzac) 10 MG capsule Take 10 mg by mouth Daily.     • levothyroxine (SYNTHROID, LEVOTHROID) 50 MCG tablet Take 75 mcg by mouth Daily.     • metoprolol tartrate (LOPRESSOR) 25 MG tablet Take 25 mg by mouth Daily. Decrease to 1/2 tablet daily     • multivitamin (MULTI VITAMIN DAILY PO) Take 1 tablet by mouth Daily.     • NIFEdipine XL (PROCARDIA XL) 60 MG 24 hr tablet Take 1 tablet by mouth Daily. 90 tablet 3   • vitamin D (ERGOCALCIFEROL) 1.25 MG (97051 UT) capsule capsule Take 1 capsule by mouth 1 (One) Time Per Week. 5 capsule 6     No current facility-administered medications for this visit.       Clove oil, Codeine, Penicillins, and Sulfa antibiotics    Past Medical History:   Diagnosis Date   • Anxiety    • Depression    • History of cholecystectomy    • History of tonsillectomy    • History of tubal ligation    • Hyperlipidemia    • Hypertension    • Hypothyroidism    • Knee pain    • PTSD (post-traumatic stress disorder)        Social History     Socioeconomic History   • Marital status:      Spouse name: Not on file   • Number of children: Not on file   • Years of education: Not on file   • Highest education level: Not on file   Tobacco Use   • Smoking status: Never Smoker   • Smokeless tobacco: Never Used   Vaping Use   • Vaping Use: Never used   Substance and Sexual Activity   • Alcohol use: Never   • Drug use: Never       Family History   Problem Relation Age of Onset   • Cancer Mother    • Heart attack Father         MI in his 60's   • Heart disease Father         s/p multiple stents   • Hyperlipidemia Father    • Hypertension Father    • No Known Problems Sister    • Heart attack Paternal Aunt    • Heart attack  "Paternal Uncle    • Cancer Maternal Grandmother    • Other Maternal Grandfather         history unknown   • Other Paternal Grandmother          from complications of gallbladder problems   • Heart attack Paternal Grandfather         MI in his 40's   • Heart disease Paternal Grandfather         CABG   • No Known Problems Sister    • Heart attack Paternal Uncle    • Bipolar disorder Daughter    • Fibromyalgia Daughter        Review of Systems   Constitutional: Positive for weight loss (intentional). Negative for decreased appetite, diaphoresis and malaise/fatigue.   HENT: Negative for nosebleeds.    Eyes: Negative for blurred vision.   Cardiovascular: Negative for chest pain, claudication, cyanosis, dyspnea on exertion, irregular heartbeat, leg swelling, near-syncope, orthopnea, palpitations, paroxysmal nocturnal dyspnea and syncope.   Respiratory: Negative for shortness of breath.    Endocrine: Negative for cold intolerance and heat intolerance.   Hematologic/Lymphatic: Does not bruise/bleed easily.   Skin: Negative for rash.   Musculoskeletal: Negative for myalgias.   Gastrointestinal: Negative for heartburn, melena and nausea.   Genitourinary: Negative for dysuria and hematuria.   Neurological: Positive for light-headedness. Negative for dizziness and weakness.   Psychiatric/Behavioral: The patient does not have insomnia and is not nervous/anxious.         BP Readings from Last 5 Encounters:   21 130/80   20 118/68   20 (!) 180/112       Wt Readings from Last 5 Encounters:   21 122 kg (268 lb 6.4 oz)   20 (!) 153 kg (338 lb)   20 (!) 151 kg (332 lb 14.3 oz)   20 (!) 151 kg (333 lb)       Objective     /80 (BP Location: Left arm)   Pulse 70   Temp 97.1 °F (36.2 °C)   Ht 154 cm (60.63\")   Wt 122 kg (268 lb 6.4 oz)   BMI 51.33 kg/m²     Vitals and nursing note reviewed.   Eyes:      Pupils: Pupils are equal, round, and reactive to light.   HENT:      Head: " Normocephalic.   Neck:      Vascular: No carotid bruit.   Pulmonary:      Breath sounds: Normal breath sounds.   Cardiovascular:      Normal rate. Regular rhythm.   Edema:     Ankle: bilateral trace edema of the ankle.  Abdominal:      General: Bowel sounds are normal.      Palpations: Abdomen is soft.   Musculoskeletal: Normal range of motion.      Cervical back: Normal range of motion. Skin:     General: Skin is warm.   Neurological:      Mental Status: Alert and oriented to person, place, and time.          Procedures: none today          Assessment/Plan   Diagnoses and all orders for this visit:    1. Morbidly obese (CMS/HCC) (Primary)    2. Sleep apnea in adult    3. Essential hypertension  -     NIFEdipine XL (PROCARDIA XL) 60 MG 24 hr tablet; Take 1 tablet by mouth Daily.  Dispense: 90 tablet; Refill: 3    4. Hypercholesteremia    5. Murmur, cardiac    6. Shortness of breath      Patient's Body mass index is 51.33 kg/m². BMI is above normal parameters. Recommendations include: nutrition counseling.  Her weight is down around 70 pounds since weight loss surgery.  I complemented her diet, exercise, and weight loss efforts.  She is monitoring vital signs closely.  Systematic has been discontinued.  We will decrease the dose of Lopressor to 1/2 tablet daily.  Continue nifedipine 60 mg daily.  If blood pressure or heart rate continues to lower then we can discontinue Lopressor altogether.  She understands to call for any concerns.    She is compliant with management of sleep apnea.    Cardiac murmur is stable.  Shortness of breath has improved.  No repeat cardiac testing warranted at this time.    Patient appears stable from a cardiac standpoint.  A 6-month follow-up visit scheduled.  Please call sooner for cardiac concerns.           Electronically signed by NOMAN Reyze,  April 2, 2021 15:16 EDT

## 2021-04-02 RX ORDER — NIFEDIPINE 60 MG/1
60 TABLET, EXTENDED RELEASE ORAL DAILY
Qty: 90 TABLET | Refills: 3 | Status: SHIPPED | OUTPATIENT
Start: 2021-04-02